# Patient Record
Sex: MALE | Race: WHITE | Employment: OTHER | URBAN - METROPOLITAN AREA
[De-identification: names, ages, dates, MRNs, and addresses within clinical notes are randomized per-mention and may not be internally consistent; named-entity substitution may affect disease eponyms.]

---

## 2017-05-16 PROBLEM — Z79.891 CHRONIC PRESCRIPTION OPIATE USE: Status: ACTIVE | Noted: 2017-05-16

## 2017-05-16 PROBLEM — M54.9 CHRONIC BACK PAIN GREATER THAN 3 MONTHS DURATION: Status: ACTIVE | Noted: 2017-05-16

## 2017-05-16 PROBLEM — G89.29 CHRONIC BACK PAIN GREATER THAN 3 MONTHS DURATION: Status: ACTIVE | Noted: 2017-05-16

## 2017-05-16 PROBLEM — F12.90 MARIJUANA USE: Status: ACTIVE | Noted: 2017-05-16

## 2017-10-16 PROBLEM — M65.341 TRIGGER FINGER, RIGHT RING FINGER: Status: ACTIVE | Noted: 2017-10-16

## 2017-10-16 PROBLEM — M25.731 CARPAL BOSS, RIGHT: Status: ACTIVE | Noted: 2017-10-16

## 2017-10-16 PROBLEM — D21.11: Status: ACTIVE | Noted: 2017-10-16

## 2019-05-09 PROBLEM — D69.6 THROMBOCYTOPENIA (HCC): Status: ACTIVE | Noted: 2019-05-09

## 2019-05-09 PROBLEM — R16.1 SPLENOMEGALY: Status: ACTIVE | Noted: 2019-05-09

## 2019-05-09 PROBLEM — R91.1 LUNG NODULE: Status: ACTIVE | Noted: 2019-05-09

## 2019-06-11 PROBLEM — K63.5 COLON POLYPS: Status: ACTIVE | Noted: 2019-06-11

## 2019-06-11 PROBLEM — Z86.19 HISTORY OF HEPATITIS C: Status: ACTIVE | Noted: 2019-06-11

## 2019-06-11 PROBLEM — K57.92 DIVERTICULITIS: Status: ACTIVE | Noted: 2019-06-11

## 2019-06-11 PROBLEM — K22.70 BARRETT'S ESOPHAGUS DETERMINED BY BIOPSY: Status: ACTIVE | Noted: 2019-06-11

## 2019-06-11 PROCEDURE — 87522 HEPATITIS C REVRS TRNSCRPJ: CPT | Performed by: NURSE PRACTITIONER

## 2019-07-23 PROCEDURE — 87522 HEPATITIS C REVRS TRNSCRPJ: CPT | Performed by: NURSE PRACTITIONER

## 2021-07-27 ENCOUNTER — OFFICE VISIT (OUTPATIENT)
Dept: NEUROLOGY | Facility: CLINIC | Age: 61
End: 2021-07-27
Payer: MEDICAID

## 2021-07-27 ENCOUNTER — LAB ENCOUNTER (OUTPATIENT)
Dept: LAB | Age: 61
End: 2021-07-27
Attending: HOSPITALIST
Payer: MEDICAID

## 2021-07-27 VITALS
WEIGHT: 279 LBS | SYSTOLIC BLOOD PRESSURE: 120 MMHG | DIASTOLIC BLOOD PRESSURE: 66 MMHG | HEIGHT: 70 IN | HEART RATE: 73 BPM | OXYGEN SATURATION: 92 % | BODY MASS INDEX: 39.94 KG/M2

## 2021-07-27 DIAGNOSIS — G25.81 RLS (RESTLESS LEGS SYNDROME): Primary | ICD-10-CM

## 2021-07-27 DIAGNOSIS — G25.81 RLS (RESTLESS LEGS SYNDROME): ICD-10-CM

## 2021-07-27 LAB
DEPRECATED HBV CORE AB SER IA-ACNC: 88.3 NG/ML
IRON SATURATION: 21 %
IRON SERPL-MCNC: 81 UG/DL
TOTAL IRON BINDING CAPACITY: 393 UG/DL (ref 240–450)
TRANSFERRIN SERPL-MCNC: 264 MG/DL (ref 200–360)

## 2021-07-27 PROCEDURE — 3074F SYST BP LT 130 MM HG: CPT | Performed by: HOSPITALIST

## 2021-07-27 PROCEDURE — 3078F DIAST BP <80 MM HG: CPT | Performed by: HOSPITALIST

## 2021-07-27 PROCEDURE — 3008F BODY MASS INDEX DOCD: CPT | Performed by: HOSPITALIST

## 2021-07-27 PROCEDURE — 99202 OFFICE O/P NEW SF 15 MIN: CPT | Performed by: HOSPITALIST

## 2021-07-27 PROCEDURE — 83540 ASSAY OF IRON: CPT

## 2021-07-27 PROCEDURE — 83550 IRON BINDING TEST: CPT

## 2021-07-27 PROCEDURE — 36415 COLL VENOUS BLD VENIPUNCTURE: CPT

## 2021-07-27 PROCEDURE — 82728 ASSAY OF FERRITIN: CPT

## 2021-07-27 RX ORDER — GABAPENTIN 300 MG/1
CAPSULE ORAL
Qty: 60 CAPSULE | Refills: 3 | Status: SHIPPED | OUTPATIENT
Start: 2021-07-27 | End: 2021-08-30

## 2021-07-27 RX ORDER — NEBULIZER ACCESSORIES
EACH MISCELLANEOUS
COMMUNITY

## 2021-07-27 RX ORDER — FLUTICASONE PROPIONATE AND SALMETEROL 250; 50 UG/1; UG/1
POWDER RESPIRATORY (INHALATION) 2 TIMES DAILY
COMMUNITY
Start: 2021-06-07

## 2021-07-27 NOTE — PATIENT INSTRUCTIONS
Start gabapentin 300 mg before bedtime for 7 days. After 7 days, start 600 mg before bedtime. Continue ropinirole at current dose for 7 days, then cut dose to 2 mg before bedtime. Call me in one week with an update.

## 2021-07-27 NOTE — PROGRESS NOTES
New patient for Restless Leg Syndrome- Patient states he was diagnosed with BUE & BLE RLS 15 years ago. Patient states he has been taking rOPINIRole HCl 2 MG Oral Tab BID for the past 15 years.  Patient states her tapered up on ropinirole as the years went

## 2021-07-27 NOTE — H&P
Neurology H&P    Dave Peace Patient Status:  No patient class for patient encounter    1960 MRN [id number]   Location [facility] Attending No att. 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     CN XII   CN XII normal.     Motor Exam   Muscle bulk: normal  Overall muscle tone: normal  Right arm pronator drift: absent  Left arm pronator drift: absent    Strength   Strength 5/5 throughout.      Sensory Exam   Light touch normal.

## 2021-07-28 ENCOUNTER — TELEPHONE (OUTPATIENT)
Dept: NEUROLOGY | Facility: CLINIC | Age: 61
End: 2021-07-28

## 2021-08-02 ENCOUNTER — TELEPHONE (OUTPATIENT)
Dept: NEUROLOGY | Facility: CLINIC | Age: 61
End: 2021-08-02

## 2021-08-02 NOTE — TELEPHONE ENCOUNTER
Patient called in stating he stopped taking the gabapentin 300mg yesterday because his legs won't stop shaking. Advised patient that sometimes this medication takes a while to work especially because he is titration up on the Gabapentin.  Patient states he

## 2021-08-09 NOTE — TELEPHONE ENCOUNTER
That was not part of the plan or what was documented. The pt was to continue on the same dose of the ropinirole. They were supposed to update me and not stop the gabapentin on their own.  Please have them resume the gabapentin at 600 mg before bedtime and c

## 2021-08-09 NOTE — TELEPHONE ENCOUNTER
Pt states that he has had the shaking in his legs and sometimes upper body for years, but that the gabapentin is not helping.   States he restarted the ropinirole in the meantime

## 2021-09-16 ENCOUNTER — TELEPHONE (OUTPATIENT)
Dept: NEUROLOGY | Facility: CLINIC | Age: 61
End: 2021-09-16